# Patient Record
Sex: MALE
[De-identification: names, ages, dates, MRNs, and addresses within clinical notes are randomized per-mention and may not be internally consistent; named-entity substitution may affect disease eponyms.]

---

## 2019-10-03 ENCOUNTER — APPOINTMENT (OUTPATIENT)
Dept: SURGERY | Facility: CLINIC | Age: 29
End: 2019-10-03
Payer: COMMERCIAL

## 2019-10-03 VITALS
SYSTOLIC BLOOD PRESSURE: 119 MMHG | DIASTOLIC BLOOD PRESSURE: 72 MMHG | HEIGHT: 64 IN | WEIGHT: 225 LBS | BODY MASS INDEX: 38.41 KG/M2

## 2019-10-03 DIAGNOSIS — Z80.3 FAMILY HISTORY OF MALIGNANT NEOPLASM OF BREAST: ICD-10-CM

## 2019-10-03 DIAGNOSIS — K40.20 BILATERAL INGUINAL HERNIA, W/OUT OBSTRUCTION OR GANGRENE, NOT SPECIFIED AS RECURRENT: ICD-10-CM

## 2019-10-03 PROCEDURE — 99202 OFFICE O/P NEW SF 15 MIN: CPT

## 2019-10-04 PROBLEM — K40.20 BILATERAL INGUINAL HERNIA: Status: ACTIVE | Noted: 2019-10-04

## 2019-10-04 PROBLEM — Z80.3 FAMILY HISTORY OF MALIGNANT NEOPLASM OF FEMALE BREAST: Status: ACTIVE | Noted: 2019-10-03

## 2019-10-04 NOTE — REASON FOR VISIT
[Initial Evaluation] : an initial evaluation [Spouse] : spouse [FreeTextEntry1] : bilateral inguinal hernias

## 2019-10-04 NOTE — HISTORY OF PRESENT ILLNESS
[de-identified] : The patient is referred by his urologist, and comes with his wife for evaluation of possible bilateral inguinal hernias. Over the past 10 years he has had intermittent episodes of fevers nausea, swollen lymph nodes in both groins, and bilateral groin pain. He has never had any obstructive symptoms or specific change in the bowel or bladder habit and he has had no weight loss.  These episodes have even required a hospital admission and he has had other evaluations including by rheumatology, with no specific diagnosis. He was seen recently by his urologist and was told that on exam that he had inguinal hernias although the hernias were not identified on a recent CT scan of the abdomen and pelvis. He is a  and states that his pain is controlled with the use of a bilateral truss.

## 2019-10-04 NOTE — PHYSICAL EXAM
[Normal Thyroid] : the thyroid was normal [Normal Breath Sounds] : Normal breath sounds [Wheezing] : wheezing was heard [Normal Heart Sounds] : normal heart sounds [Normal Rate and Rhythm] : normal rate and rhythm [Abdomen Tenderness] : ~T ~M No abdominal tenderness [Abdominal Masses] : No abdominal masses [No HSM] : no hepatosplenomegaly [de-identified] : no adenopathy [de-identified] : Both supine and standing, with coughing and straining, no hernias are noted in either inguinal or femoral region. These areas are somewhat tender on examination, but no regional adenopathy or other abnormalities are noted. The external genitalia are unremarkable.

## 2019-10-04 NOTE — ASSESSMENT
[FreeTextEntry1] : No hernias or other specific pathology identified on clinical exam or recent CT scan of the abdomen and pelvis. No surgery is advised at this time and the cause of his symptoms remains indeterminate. The patient will however return here in about 3-4 weeks for reevaluation to see if there is a change in his exam that may indicate the need for surgery. Especially in light of his weightlifting, appropriate precautions and warning signs were advised for the interim. All their questions were answered and they understand and agree. They will contact me sooner as needed.

## 2019-11-04 ENCOUNTER — APPOINTMENT (OUTPATIENT)
Dept: SURGERY | Facility: CLINIC | Age: 29
End: 2019-11-04
Payer: COMMERCIAL

## 2019-11-04 VITALS
HEART RATE: 68 BPM | BODY MASS INDEX: 37.39 KG/M2 | SYSTOLIC BLOOD PRESSURE: 126 MMHG | TEMPERATURE: 97.7 F | WEIGHT: 219 LBS | DIASTOLIC BLOOD PRESSURE: 81 MMHG | OXYGEN SATURATION: 97 % | HEIGHT: 64 IN

## 2019-11-04 DIAGNOSIS — R10.31 RIGHT LOWER QUADRANT PAIN: ICD-10-CM

## 2019-11-04 DIAGNOSIS — R10.2 PELVIC AND PERINEAL PAIN: ICD-10-CM

## 2019-11-04 DIAGNOSIS — R19.00 INTRA-ABDOMINAL AND PELVIC SWELLING, MASS AND LUMP, UNSPECIFIED SITE: ICD-10-CM

## 2019-11-04 DIAGNOSIS — R10.32 RIGHT LOWER QUADRANT PAIN: ICD-10-CM

## 2019-11-04 PROCEDURE — 99213 OFFICE O/P EST LOW 20 MIN: CPT

## 2019-11-06 NOTE — ASSESSMENT
[FreeTextEntry1] : 28 year old male. No overt hernia on examination. His care is fragmented and dispersed among many institutions. I have requested he attempt to collect records over the past 10 years to the extent that is possible. At this point he needs at primary care physician here as well to initiate a work up for infectious disease and to establish care. He requires an MRI of the pelvis to evaluate tendons, joints, musculature and bone and to evaluate the soft tissue. I have asked him to have a dynamic pelvic protocol and to return after so that we may review together and discuss options. I answered all questions. Notably greater than 50% of todays 30 minute initial visit was spent on counseling and coordiantion of his care.

## 2019-11-06 NOTE — HISTORY OF PRESENT ILLNESS
[de-identified] : 28 year old . Here with 10 year history of groin pain. Recently moved from [place]. His care has been fragmented over the years. Reports his groin pain is intermittent but debilatating when it happens. Nothing makes it better or worse when it happens. He feels sometimes feverish, but is not sure about it. He denies any weight loss other than loss of muscle mass since stopping heavy lifting. He is here with his father and his long standing girlfriend. He has seen many surgeons on the past that have "not found anything" He reports no prior surgery on the area, but on examination there is a scar in the left groin. Reports that he did forget that a lymph node biopsy was performed at some point on the left side and was found to be per patient "normal"

## 2019-11-06 NOTE — PHYSICAL EXAM
[JVD] : no jugular venous distention  [Normal Breath Sounds] : Normal breath sounds [Normal Heart Sounds] : normal heart sounds [Abdominal Masses] : No abdominal masses [Abdomen Tenderness] : ~T ~M No abdominal tenderness [Tender] : was nontender [Enlarged] : not enlarged [Alert] : alert [Oriented to Person] : oriented to person [Oriented to Place] : oriented to place [Oriented to Time] : oriented to time [Calm] : calm [de-identified] : ETHAN. [place]. Appropriate. Comfortable. [de-identified] : Pupils equal. No Scleral Icterus. NCAT. [de-identified] : Supple. Trachea midline. No overt lymphadenopathy. No JVD [de-identified] : Abdomen is soft, non tender and non distended. Do not appreciate any overt mass. No overt hernia detected. There is bilateral shoddy lymphadenopathy. No overt tenderness. Small scar left groin well healed. I do not appreciate and overt hernia on examination. \par